# Patient Record
(demographics unavailable — no encounter records)

---

## 2025-02-21 NOTE — HISTORY OF PRESENT ILLNESS
[de-identified] : Patient comes in with snoring at night with  an open mouth. He does not have any issues with sinus infections or issues with throat infections. He has been using nasal saline. He has mild nasal congestion during the day on occasion  Mom reports that he has been snoring for awhile, even when not sick although he has had a mild cold recently

## 2025-02-21 NOTE — CONSULT LETTER
[Dear  ___] : Dear  [unfilled], [Consult Letter:] : I had the pleasure of evaluating your patient, [unfilled]. [Please see my note below.] : Please see my note below. [Consult Closing:] : Thank you very much for allowing me to participate in the care of this patient.  If you have any questions, please do not hesitate to contact me. [Sincerely,] : Sincerely, [FreeTextEntry3] : Owen Cowart MD [facility] Associated Professor, [place]

## 2025-02-21 NOTE — ASSESSMENT
[FreeTextEntry1] : Patient referred for evaluation snoring mom has a video no apneic episodes on examination tonsils are not large never really tried Flonase for prolonged period of time some suggested we use Flonase for a month see what happens think it is unlikely that any additional intervention will be indicated this was explained to mom and understood and accepted and will follow-up and see us in 3 months.

## 2025-02-21 NOTE — END OF VISIT
[FreeTextEntry3] : I, Dr. Cowart personally performed the evaluation and management (E/M) services including all necessary procedures, for this new patient. That E/M includes conducting the clinically appropriate initial history &/or exam, assessing all conditions, and establishing the plan of care. Today, my KATERINE, Soni Hodge, was here to observe &/or participate in the visit & follow plan of care established by me.